# Patient Record
Sex: FEMALE | Race: WHITE | URBAN - METROPOLITAN AREA
[De-identification: names, ages, dates, MRNs, and addresses within clinical notes are randomized per-mention and may not be internally consistent; named-entity substitution may affect disease eponyms.]

---

## 2017-04-28 ENCOUNTER — OFFICE VISIT (OUTPATIENT)
Dept: FAMILY MEDICINE CLINIC | Age: 52
End: 2017-04-28

## 2017-04-28 VITALS
BODY MASS INDEX: 34.99 KG/M2 | RESPIRATION RATE: 12 BRPM | WEIGHT: 210 LBS | OXYGEN SATURATION: 98 % | HEIGHT: 65 IN | DIASTOLIC BLOOD PRESSURE: 74 MMHG | HEART RATE: 88 BPM | SYSTOLIC BLOOD PRESSURE: 126 MMHG

## 2017-04-28 DIAGNOSIS — R22.31 ARM MASS, RIGHT: ICD-10-CM

## 2017-04-28 DIAGNOSIS — L03.113 CELLULITIS OF ARM, RIGHT: Primary | ICD-10-CM

## 2017-04-28 PROCEDURE — 99213 OFFICE O/P EST LOW 20 MIN: CPT | Performed by: FAMILY MEDICINE

## 2017-04-28 RX ORDER — CLINDAMYCIN HYDROCHLORIDE 300 MG/1
300 CAPSULE ORAL 3 TIMES DAILY
Qty: 21 CAPSULE | Refills: 0 | Status: SHIPPED | OUTPATIENT
Start: 2017-04-28 | End: 2017-05-08

## 2018-12-07 ENCOUNTER — OFFICE VISIT (OUTPATIENT)
Dept: FAMILY MEDICINE CLINIC | Age: 53
End: 2018-12-07
Payer: COMMERCIAL

## 2018-12-07 VITALS
SYSTOLIC BLOOD PRESSURE: 122 MMHG | BODY MASS INDEX: 37.65 KG/M2 | HEIGHT: 65 IN | TEMPERATURE: 98.4 F | RESPIRATION RATE: 16 BRPM | OXYGEN SATURATION: 98 % | HEART RATE: 88 BPM | DIASTOLIC BLOOD PRESSURE: 72 MMHG | WEIGHT: 226 LBS

## 2018-12-07 DIAGNOSIS — D49.2 NEOPLASM OF EXTERNAL EAR: ICD-10-CM

## 2018-12-07 DIAGNOSIS — E66.09 CLASS 2 OBESITY DUE TO EXCESS CALORIES WITHOUT SERIOUS COMORBIDITY WITH BODY MASS INDEX (BMI) OF 36.0 TO 36.9 IN ADULT: ICD-10-CM

## 2018-12-07 DIAGNOSIS — Z00.00 WELL ADULT EXAM: Primary | ICD-10-CM

## 2018-12-07 DIAGNOSIS — Z12.11 SCREENING FOR COLON CANCER: ICD-10-CM

## 2018-12-07 DIAGNOSIS — Z23 NEED FOR TDAP VACCINATION: ICD-10-CM

## 2018-12-07 PROCEDURE — 99396 PREV VISIT EST AGE 40-64: CPT | Performed by: FAMILY MEDICINE

## 2018-12-07 PROCEDURE — G8484 FLU IMMUNIZE NO ADMIN: HCPCS | Performed by: FAMILY MEDICINE

## 2018-12-07 PROCEDURE — 90471 IMMUNIZATION ADMIN: CPT | Performed by: FAMILY MEDICINE

## 2018-12-07 PROCEDURE — 90715 TDAP VACCINE 7 YRS/> IM: CPT | Performed by: FAMILY MEDICINE

## 2018-12-07 ASSESSMENT — PATIENT HEALTH QUESTIONNAIRE - PHQ9
SUM OF ALL RESPONSES TO PHQ9 QUESTIONS 1 & 2: 0
2. FEELING DOWN, DEPRESSED OR HOPELESS: 0
SUM OF ALL RESPONSES TO PHQ QUESTIONS 1-9: 0
1. LITTLE INTEREST OR PLEASURE IN DOING THINGS: 0
SUM OF ALL RESPONSES TO PHQ QUESTIONS 1-9: 0

## 2018-12-07 NOTE — PROGRESS NOTES
2018    Ilir Dunlap (:  1965) is a 48 y.o. female, here for a preventive medicine evaluation. Patient Active Problem List   Diagnosis    Stye    Lump or mass in breast    Chest pain    Depression    Nevus, atypical    Obesity    Tobacco use disorder    Stress incontinence     Review of Systems Review of systems in history of present illness or scanned in under media tab. Prior to Visit Medications    Not on File        Allergies   Allergen Reactions    No Known Allergies        Past Medical History:   Diagnosis Date    Asthmatic bronchitis     Breast mass     Depression     Hordeolum externum (stye) 10/01/2008    RECURRENT    Obesity     Stress incontinence     Tobacco use disorder     quit        Past Surgical History:   Procedure Laterality Date    BLADDER REPAIR  1996    lift with cystoscopy and bx. 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  Drug use: No    Sexual activity: No     Other Topics Concern    Not on file     Social History Narrative    Family History: 2010    psychiatric dz    Patient Lives at: home, Support System: adequate    [employer] work only exercise. 18.         Family History   Problem Relation Age of Onset    Heart Failure Mother 61    Obesity Mother     Hypertension Mother     High Cholesterol Mother     Other Mother 64        MRSA infection x 2 yr, history of gastric bypass    Heart Attack Mother         caused by sepis/CHF/CHF    Lung Cancer Father     Other Father 28        DVT/pulmonary embolism after an injury    Cancer Paternal Grandfather         throat versus esophageal- smoker    No Known Problems Brother     Kidney Disease Maternal Grandfather         CKD /renal failure    Cancer Paternal Grandmother         ? ADVANCE DIRECTIVE: N, Not Received    Physical Exam   Constitutional: She appears well-developed and well-nourished. No distress. Obesity. HENT:   Right Ear: Tympanic membrane, external ear and ear canal normal. Tympanic membrane is not retracted and not bulging. No middle ear effusion. Left Ear: Tympanic membrane, external ear and ear canal normal. Tympanic membrane is not retracted and not bulging. No middle ear effusion. Nose: Nose normal. No mucosal edema or rhinorrhea. Mouth/Throat: Uvula is midline, oropharynx is clear and moist and mucous membranes are normal. Mucous membranes are not pale, not dry and not cyanotic. No oral lesions. No uvula swelling or dental caries. No oropharyngeal exudate, posterior oropharyngeal edema, posterior oropharyngeal erythema or tonsillar abscesses. Eyes: Pupils are equal, round, and reactive to light. Conjunctivae and EOM are normal. Right eye exhibits no discharge and no exudate. Left eye exhibits no discharge and no exudate. Right conjunctiva is not injected. Left conjunctiva is not injected. No scleral icterus. Neck: Trachea normal and phonation normal. Neck supple. No tracheal tenderness present. No tracheal deviation present. No thyroid mass and no thyromegaly present. Cardiovascular: Normal rate, regular rhythm and S1 normal.   No extrasystoles are present.  Exam reveals no gallop, no S3, no S4, no distant heart sounds and no friction rub. No murmur heard. Pulses:       Dorsalis pedis pulses are 2+ on the right side, and 2+ on the left side. Posterior tibial pulses are 2+ on the right side, and 2+ on the left side. Pulmonary/Chest: Effort normal and breath sounds normal. No stridor. No respiratory distress. She has no decreased breath sounds. She has no wheezes. She has no rhonchi. She has no rales. Abdominal: Normal appearance. She exhibits no distension, no pulsatile midline mass and no mass. There is no hepatosplenomegaly. There is no tenderness. There is no rigidity, no rebound, no guarding, no CVA tenderness, no tenderness at McBurney's point and negative Guzman's sign. Lymphadenopathy:        Head (right side): No submandibular and no tonsillar adenopathy present. Head (left side): No submandibular and no tonsillar adenopathy present. She has no cervical adenopathy. Right cervical: No superficial cervical, no deep cervical and no posterior cervical adenopathy present. Left cervical: No superficial cervical, no deep cervical and no posterior cervical adenopathy present. She has no axillary adenopathy. Right axillary: No pectoral and no lateral adenopathy present. Left axillary: No pectoral and no lateral adenopathy present. Right: No supraclavicular adenopathy present. Left: No supraclavicular adenopathy present. Neurological: She is alert. She displays no tremor. She exhibits normal muscle tone. Coordination and gait normal.   Reflex Scores:       Patellar reflexes are 2+ on the right side and 2+ on the left side. Skin: Skin is warm and dry. No lesion noted. She is not diaphoretic. No cyanosis. No pallor. Nails show no clubbing. Psychiatric: She has a normal mood and affect. Her behavior is normal. Judgment normal. Cognition and memory are normal.   Nursing note and vitals reviewed.     Vitals:    12/07/18 1443   BP: 122/72   Site: Left Upper Arm Position: Sitting   Cuff Size: Large Adult   Pulse: 88   Resp: 16   Temp: 98.4 °F (36.9 °C)   TempSrc: Oral   SpO2: 98%   Weight: 226 lb (102.5 kg)   Height: 5' 5\" (1.651 m)     BP Readings from Last 3 Encounters:   12/07/18 122/72   04/28/17 126/74     Pulse Readings from Last 3 Encounters:   12/07/18 88   04/28/17 88     Wt Readings from Last 3 Encounters:   12/07/18 226 lb (102.5 kg)   04/28/17 210 lb (95.3 kg)     Body mass index is 37.61 kg/m². No results found for: CHOL, CHOLFAST, TRIG, TRIGLYCFAST, HDL, LDLCHOLESTEROL, LDLCALC, GLUF, GLUCOSE, LABA1C    The ASCVD Risk score (Sathish Martinez, et al., 2013) failed to calculate for the following reasons:    Cannot find a previous HDL lab    Cannot find a previous total cholesterol lab     [facility] MAMMO SCR DIG BILExam Date/Time: 11/29/2018 11:30 AM   SCREENING DIGITAL BILATERAL MAMMOGRAM WITH COMPUTER AIDED DETECTION  HISTORY: Routine annual screening  COMPARISON: 2017 2016, 2015, 2014  TECHNIQUE:   Bilateral mediolateral oblique and craniocaudal views with computer aided   detection  BREAST COMPOSITION:  There are scattered areas of fibroglandular density  FINDINGS:   There is a radiopaque marker seen in the anterior depth of the right upper-outer   breast related to previous biopsy. There are no suspicious calcifications or masses  IMPRESSION:  No mammographic evidence of malignancy  CATEGORY BI-RADS: 2: Benign  MANAGEMENT: Routine annual mammography unless otherwise clinically indicated  NOTE:  The breast center staff will notify the patient of the results.  If there are questions   about the content of this report, please contact [address] [place] [address] radiology by calling [phone]. RISK ASSESSMENT:  A preliminary breast cancer risk assessment was conducted as part of the   patient's screening mammogram visit.  If there are any questions about the risk assessment   process or results, a navigator can be contacted at [phone].   Patient's 5 year [facility] note.    --Yadi Ya, DO on 12/7/2018 at 3:16 PM

## 2019-04-15 ENCOUNTER — OFFICE VISIT (OUTPATIENT)
Dept: FAMILY MEDICINE CLINIC | Age: 54
End: 2019-04-15
Payer: COMMERCIAL

## 2019-04-15 VITALS
DIASTOLIC BLOOD PRESSURE: 68 MMHG | HEART RATE: 84 BPM | WEIGHT: 225 LBS | BODY MASS INDEX: 37.49 KG/M2 | SYSTOLIC BLOOD PRESSURE: 114 MMHG | HEIGHT: 65 IN | OXYGEN SATURATION: 97 % | RESPIRATION RATE: 21 BRPM

## 2019-04-15 DIAGNOSIS — M25.561 ACUTE PAIN OF RIGHT KNEE: Primary | ICD-10-CM

## 2019-04-15 DIAGNOSIS — R03.0 ELEVATED BP WITHOUT DIAGNOSIS OF HYPERTENSION: ICD-10-CM

## 2019-04-15 PROCEDURE — 99215 OFFICE O/P EST HI 40 MIN: CPT | Performed by: FAMILY MEDICINE

## 2019-04-15 PROCEDURE — G8417 CALC BMI ABV UP PARAM F/U: HCPCS | Performed by: FAMILY MEDICINE

## 2019-04-15 PROCEDURE — G8427 DOCREV CUR MEDS BY ELIG CLIN: HCPCS | Performed by: FAMILY MEDICINE

## 2019-04-15 PROCEDURE — 1036F TOBACCO NON-USER: CPT | Performed by: FAMILY MEDICINE

## 2019-04-15 PROCEDURE — 3017F COLORECTAL CA SCREEN DOC REV: CPT | Performed by: FAMILY MEDICINE

## 2019-04-15 RX ORDER — NAPROXEN 500 MG/1
500 TABLET ORAL 2 TIMES DAILY WITH MEALS
Qty: 60 TABLET | Refills: 1 | Status: SHIPPED | OUTPATIENT
Start: 2019-04-15

## 2019-04-15 SDOH — ECONOMIC STABILITY: TRANSPORTATION INSECURITY
IN THE PAST 12 MONTHS, HAS LACK OF TRANSPORTATION KEPT YOU FROM MEETINGS, WORK, OR FROM GETTING THINGS NEEDED FOR DAILY LIVING?: NO

## 2019-04-15 SDOH — ECONOMIC STABILITY: INCOME INSECURITY: HOW HARD IS IT FOR YOU TO PAY FOR THE VERY BASICS LIKE FOOD, HOUSING, MEDICAL CARE, AND HEATING?: NOT HARD AT ALL

## 2019-04-15 SDOH — ECONOMIC STABILITY: FOOD INSECURITY: WITHIN THE PAST 12 MONTHS, YOU WORRIED THAT YOUR FOOD WOULD RUN OUT BEFORE YOU GOT MONEY TO BUY MORE.: NEVER TRUE

## 2019-04-15 SDOH — ECONOMIC STABILITY: TRANSPORTATION INSECURITY
IN THE PAST 12 MONTHS, HAS THE LACK OF TRANSPORTATION KEPT YOU FROM MEDICAL APPOINTMENTS OR FROM GETTING MEDICATIONS?: NO

## 2019-04-15 SDOH — ECONOMIC STABILITY: FOOD INSECURITY: WITHIN THE PAST 12 MONTHS, THE FOOD YOU BOUGHT JUST DIDN'T LAST AND YOU DIDN'T HAVE MONEY TO GET MORE.: NEVER TRUE

## 2019-04-15 ASSESSMENT — PATIENT HEALTH QUESTIONNAIRE - PHQ9
SUM OF ALL RESPONSES TO PHQ QUESTIONS 1-9: 0
1. LITTLE INTEREST OR PLEASURE IN DOING THINGS: 0
2. FEELING DOWN, DEPRESSED OR HOPELESS: 0
SUM OF ALL RESPONSES TO PHQ QUESTIONS 1-9: 0
SUM OF ALL RESPONSES TO PHQ9 QUESTIONS 1 & 2: 0

## 2019-04-15 ASSESSMENT — ENCOUNTER SYMPTOMS
CHEST TIGHTNESS: 0
SHORTNESS OF BREATH: 0

## 2019-04-15 NOTE — PATIENT INSTRUCTIONS
Susan Angelo was seen today for knee pain. Diagnoses and all orders for this visit:    Acute pain of right knee  -     naproxen (NAPROXEN) 500 MG EC tablet; Take 1 tablet by mouth 2 times daily (with meals)  -     diclofenac sodium 1 % GEL; Apply 4 g topically 4 times daily  Omeprazole 20 mg 30 min before breakfast if heartburn. Medrol dose pack if not improving. Ultrasound if not improving. Then to orthopedic if not improving. Elevated BP without diagnosis of hypertension  Watch salt. Gardner's lite or Sea salt (with iodine) both have about 50 % less salt than regular salt. If you use the same amount as you are currently using at the table or in the cooking your blood pressure will go down. Since both these products have high levels of potassium you must be careful in using these with certain blood pressure medicines such as ACE inhibitors (like lisinopril, etc) ARB's (Angiotension receptor blockers - like Valsartan and Losartan, etc) and with potassium sparing diuretics ( Maxzide, Spironolactone, etc). The potassium can go too high (above 5.1). \"No Salt\" (German's) or  Gardner's \"Salt Substitute-sodium free\" will raise your potassium and tastes like salt. They [id number] % potassium and still taste salty but have an even higher risk of increasing the blood potassium level. This can be helpful if your potassium is typically low or low normal on NON-potassium sparing diuretic such as Lasix (furosemide) or HCTZ (hydrocholorothiazide). Mrs MERCEDES is only herbs and can be used with out the concern of too much potassium. It seasons the food but does not taste like salt. Patient Education     Almanza's Cyst: Care Instructions  Your Care Instructions    A Baker's cyst is a swelling behind the knee. It may cause pain or stiffness when you bend your knee or straighten it all the way. Baker's cysts are also called popliteal cysts.   If you have arthritis or another condition that is the cause of the Baker's cyst, your doctor may treat that condition. A Baker's cyst may go away on its own. If not, or if it is causing a lot of discomfort, your doctor may drain the fluid that has built up behind the knee. In some cases, a Baker's cyst is removed in surgery. There are things you can do at home, such as staying off your leg, to reduce the swelling and pain. Follow-up care is a key part of your treatment and safety. Be sure to make and go to all appointments, and call your doctor if you are having problems. It's also a good idea to know your test results and keep a list of the medicines you take. How can you care for yourself at home? · Rest your knee as much as possible. · Ask your doctor if you can take an over-the-counter pain medicine, such as acetaminophen (Tylenol), ibuprofen (Advil, Motrin), or naproxen (Aleve). Be safe with medicines. Read and follow all instructions on the label. · Use a cane, a crutch, a walker, or another device if you need help to get around. These can help rest your knees. · If you have an elastic bandage, make sure it is snug but not so tight that your leg is numb, tingles, or swells below the bandage. Ask your doctor if you can loosen the bandage if it is too tight. · Follow your doctor's instructions about how much weight you can put on your knee. · Stay at a healthy weight. Being overweight puts extra strain on your knee. When should you call for help? Call 911 anytime you think you may need emergency care. For example, call if:    · You have chest pain, are short of breath, or you cough up blood.    Call your doctor now or seek immediate medical care if:    · You have new or worse pain.     · Your foot is cool or pale or changes color.     · You have tingling, weakness, or numbness in your foot or toes.     · You have signs of a blood clot in your leg (called a deep vein thrombosis), such as:  ? Pain in your calf, back of the knee, thigh, or groin. ? Redness or swelling in your leg.  Watch closely for changes in your health, and be sure to contact your doctor if:    · You do not get better as expected. Where can you learn more? Go to https://[employer] Grouppepiceweb.Pap[employer]. org and sign in to your PPS account. Enter J158 in the SpiderSuite box to learn more about \"Baker's Cyst: Care Instructions. \"     If you do not have an account, please click on the \"Sign Up Now\" link. Current as of: September 20, 2018  Content Version: 11.9  © 2797-4492 [employer]. Care instructions adapted under license by [employer] ([facility]). If you have questions about a medical condition or this instruction, always ask your healthcare professional. [address] any warranty or liability for your use of this information. Patient Education        The Knee Joint: Anatomy Sketch    Current as of: September 20, 2018  Content Version: 11.9  © 4876-9064 [employer]. Care instructions adapted under license by [employer] ([facility]). If you have questions about a medical condition or this instruction, always ask your healthcare professional. [address] any warranty or liability for your use of this information. Patient Education        Learning About High Blood Pressure  What is high blood pressure? Blood pressure is a measure of how hard the blood pushes against the walls of your arteries. It's normal for blood pressure to go up and down throughout the day, but if it stays up, you have high blood pressure. Another name for high blood pressure is hypertension. Two numbers tell you your blood pressure. The first number is the systolic pressure. It shows how hard the blood pushes when your heart is pumping. The second number is the diastolic pressure. It shows how hard the blood pushes between heartbeats, when your heart is relaxed and filling with blood. Your doctor will give you a goal for your blood pressure.  Your goal will be based on your health and your age. High blood pressure (hypertension) means that the top number stays high, or the bottom number stays high, or both. High blood pressure increases the risk of stroke, heart attack, and other problems. You and your doctor will talk about your risks of these problems based on your blood pressure. What happens when you have high blood pressure? · Blood flows through your arteries with too much force. Over time, this damages the walls of your arteries. But you can't feel it. High blood pressure usually doesn't cause symptoms. · Fat and calcium start to build up in your arteries. This buildup is called plaque. Plaque makes your arteries narrower and stiffer. Blood can't flow through them as easily. · This lack of good blood flow starts to damage some of the organs in your body. This can lead to problems such as coronary artery disease and heart attack, heart failure, stroke, kidney failure, and eye damage. How can you prevent high blood pressure? · Stay at a healthy weight. · Try to limit how much sodium you eat to less than 2,300 milligrams (mg) a day. If you limit your sodium to 1,500 mg a day, you can lower your blood pressure even more. ? Buy foods that are labeled \"unsalted,\" \"sodium-free,\" or \"low-sodium. \" Foods labeled \"reduced-sodium\" and \"light sodium\" may still have too much sodium. ? Flavor your food with garlic, lemon juice, onion, vinegar, herbs, and spices instead of salt. Do not use soy sauce, steak sauce, onion salt, garlic salt, mustard, or ketchup on your food. ? Use less salt (or none) when recipes call for it. You can often use half the salt a recipe calls for without losing flavor. · Be physically active. Get at least 30 minutes of exercise on most days of the week. Walking is a good choice. You also may want to do other activities, such as running, swimming, cycling, or playing tennis or team sports.   · Limit alcohol to 2 drinks a day for men and 1 drink a day for women.  · Eat plenty of fruits, vegetables, and low-fat dairy products. Eat less saturated and total fats. How is high blood pressure treated? · Your doctor will suggest making lifestyle changes. For example, your doctor may ask you to eat healthy foods, quit smoking, lose extra weight, and be more active. · If lifestyle changes don't help enough, your doctor may recommend that you take medicine. · When blood pressure is very high, medicines are needed to lower it. Follow-up care is a key part of your treatment and safety. Be sure to make and go to all appointments, and call your doctor if you are having problems. It's also a good idea to know your test results and keep a list of the medicines you take. Where can you learn more? Go to https://The Daily Caller.[employer]. org and sign in to your katena account. Enter P501 in the Dealdrive box to learn more about \"Learning About High Blood Pressure. \"     If you do not have an account, please click on the \"Sign Up Now\" link. Current as of: July 22, 2018  Content Version: 11.9  © 9968-6150 [employer]. Care instructions adapted under license by [facility] ([facility]). If you have questions about a medical condition or this instruction, always ask your healthcare professional. [address] any warranty or liability for your use of this information. Patient Education        DASH Diet: Care Instructions  Your Care Instructions    The DASH diet is an eating plan that can help lower your blood pressure. DASH stands for Dietary Approaches to Stop Hypertension. Hypertension is high blood pressure. The DASH diet focuses on eating foods that are high in calcium, potassium, and magnesium. These nutrients can lower blood pressure. The foods that are highest in these nutrients are fruits, vegetables, low-fat dairy products, nuts, seeds, and legumes.  But taking calcium, potassium, and magnesium supplements instead of eating foods that are high in those nutrients does not have the same effect. The DASH diet also includes whole grains, fish, and poultry. The DASH diet is one of several lifestyle changes your doctor may recommend to lower your high blood pressure. Your doctor may also want you to decrease the amount of sodium in your diet. Lowering sodium while following the DASH diet can lower blood pressure even further than just the DASH diet alone. Follow-up care is a key part of your treatment and safety. Be sure to make and go to all appointments, and call your doctor if you are having problems. It's also a good idea to know your test results and keep a list of the medicines you take. How can you care for yourself at home? Following the DASH diet  · Eat 4 to 5 servings of fruit each day. A serving is 1 medium-sized piece of fruit, ½ cup chopped or canned fruit, 1/4 cup dried fruit, or 4 ounces (½ cup) of fruit juice. Choose fruit more often than fruit juice. · Eat 4 to 5 servings of vegetables each day. A serving is 1 cup of lettuce or raw leafy vegetables, ½ cup of chopped or cooked vegetables, or 4 ounces (½ cup) of vegetable juice. Choose vegetables more often than vegetable juice. · Get 2 to 3 servings of low-fat and fat-free dairy each day. A serving is 8 ounces of milk, 1 cup of yogurt, or 1 ½ ounces of cheese. · Eat 6 to 8 servings of grains each day. A serving is 1 slice of bread, 1 ounce of dry cereal, or ½ cup of cooked rice, pasta, or cooked cereal. Try to choose whole-grain products as much as possible. · Limit lean meat, poultry, and fish to 2 servings each day. A serving is 3 ounces, about the size of a deck of cards. · Eat 4 to 5 servings of nuts, seeds, and legumes (cooked dried beans, lentils, and split peas) each week. A serving is 1/3 cup of nuts, 2 tablespoons of seeds, or ½ cup of cooked beans or peas. · Limit fats and oils to 2 to 3 servings each day.  A serving is 1 teaspoon of vegetable oil or 2 tablespoons of salad dressing. · Limit sweets and added sugars to 5 servings or less a week. A serving is 1 tablespoon jelly or jam, ½ cup sorbet, or 1 cup of lemonade. · Eat less than 2,300 milligrams (mg) of sodium a day. If you limit your sodium to 1,500 mg a day, you can lower your blood pressure even more. Tips for success  · Start small. Do not try to make dramatic changes to your diet all at once. You might feel that you are missing out on your favorite foods and then be more likely to not follow the plan. Make small changes, and stick with them. Once those changes become habit, add a few more changes. · Try some of the following:  ? Make it a goal to eat a fruit or vegetable at every meal and at snacks. This will make it easy to get the recommended amount of fruits and vegetables each day. ? Try yogurt topped with fruit and nuts for a snack or healthy dessert. ? Add lettuce, tomato, cucumber, and onion to sandwiches. ? Combine a ready-made pizza crust with low-fat mozzarella cheese and lots of vegetable toppings. Try using tomatoes, squash, spinach, broccoli, carrots, cauliflower, and onions. ? Have a variety of cut-up vegetables with a low-fat dip as an appetizer instead of chips and dip. ? Sprinkle sunflower seeds or chopped almonds over salads. Or try adding chopped walnuts or almonds to cooked vegetables. ? Try some vegetarian meals using beans and peas. Add garbanzo or kidney beans to salads. Make burritos and tacos with mashed bright beans or black beans. Where can you learn more? Go to https://Corrupt LacejanelleEsperotia Energy Investmentseb.healthDove Innovation and Management. org and sign in to your Third Age account. Enter J066 in the [facility] box to learn more about \"DASH Diet: Care Instructions. \"     If you do not have an account, please click on the \"Sign Up Now\" link. Current as of: July 22, 2018  Content Version: 11.9  © 7256-1396 [employer]. Care instructions adapted under license by [facility] ([facility]).  If you have questions about a medical condition or this instruction, always ask your healthcare professional. [place] [address] any warranty or liability for your use of this information.

## 2019-04-15 NOTE — PROGRESS NOTES
Subjective:      Patient ID: Lesley Theodore is a 47 y.o. female. Chief Complaint   Patient presents with    Knee Pain     pt c/o right knee pain and swelling x 2 days. pt jumped off a retaining wall while doing yard work  and knee started to swell. patient states she got the swelling down but then while mowing the grass later on saturday she stepped in a [place] and rolled her knee. right knee is swollen painful, tender to touch, and unable to weight onit. pt using crutch for stability right now   448  HPI  Right knee pain  No prior knee problems. No prior knee injuries/surgeries. No OA in other joints. She tried ice, heat, sports cream, salon-hoara. Took 2 ibuprofen this am and not sure if helped. Hurts worse when she walks flat footed. Less if just toe touches. HTN  Does watch salt. Adds some in cooking. Has used a salt sub also. No current outpatient medications on file. No current facility-administered medications for this visit. Review of Systems   Respiratory: Negative for chest tightness and shortness of breath. Cardiovascular: Negative for chest pain, palpitations and leg swelling. Neurological: Negative for dizziness, tremors, light-headedness and headaches. Objective:   Physical Exam   Constitutional: Vital signs are normal. She appears well-developed. She is cooperative. She does not appear ill. No distress. Musculoskeletal:        Right knee: She exhibits swelling and effusion. She exhibits normal range of motion, no ecchymosis, no deformity, no erythema, normal alignment, normal patellar mobility, no bony tenderness and normal meniscus. Tenderness (in popliteal fossa) found. No medial joint line, no lateral joint line and no patellar tendon tenderness noted. Left knee: She exhibits normal range of motion, no swelling, no effusion, no ecchymosis, no erythema, normal alignment, normal patellar mobility and normal meniscus. No tenderness found.  No medial joint line, no lateral joint line and no patellar tendon tenderness noted. Does have some pain at medial and lateral joint line as well with flexion-extension and internal/external rotation of the foot with the knee flexed. Neurological: She is alert. Skin: She is not diaphoretic. Nursing note and vitals reviewed. Vitals:    04/15/19 1637   BP: 114/68   Site: Left Upper Arm   Position: Sitting   Cuff Size: Large Adult   Pulse: 84   Resp: 21   SpO2: 97%   Weight: 225 lb (102.1 kg)  Comment: shoes on   Height: 5' 5\" (1.651 m)     BP Readings from Last 3 Encounters:   04/15/19 114/68   12/07/18 122/72   04/28/17 126/74     Pulse Readings from Last 3 Encounters:   04/15/19 84   12/07/18 88   04/28/17 88     Wt Readings from Last 3 Encounters:   04/15/19 225 lb (102.1 kg)   12/07/18 226 lb (102.5 kg)   04/28/17 210 lb (95.3 kg)     Body mass index is 37.44 kg/m². Assessment:      1. Acute pain of right knee    2. Elevated BP without diagnosis of hypertension          Plan:       - Counseling More Than 50% of the 40 min Appointment Time     Felisha Lepe was seen today for knee pain. Diagnoses and all orders for this visit:    Acute pain of right knee  -     naproxen (NAPROXEN) 500 MG EC tablet; Take 1 tablet by mouth 2 times daily (with meals)  -     diclofenac sodium 1 % GEL; Apply 4 g topically 4 times daily  Omeprazole 20 mg 30 min before breakfast if heartburn. Medrol dose pack if not improving. Ultrasound if not improving. Due to popliteal fossa pain - rule out popliteal cyst.  No glucosamine chondroitin at this time due to possibility of popliteal cyst.  Then to orthopedic if not improving. May need an MRI of the knee if symptoms of ligamentous or meniscal injury occurs. If knee pain and or swelling is persistent. Discussed minor meniscal tears as usually not requiring surgery unless the knee is locking up or giving out causing prolonged episodes of pain.   Could consider a steroid shot in the knee if patient has good improvement but has residual pain. No steroid shots if surgery is possibility. Elevated BP without diagnosis of hypertension  Watch salt. Gardner's lite or Sea salt (with iodine) both have about 50 % less salt than regular salt. If you use the same amount as you are currently using at the table or in the cooking your blood pressure will go down. Since both these products have high levels of potassium you must be careful in using these with certain blood pressure medicines such as ACE inhibitors (like lisinopril, etc) ARB's (Angiotension receptor blockers - like Valsartan and Losartan, etc) and with potassium sparing diuretics ( Maxzide, Spironolactone, etc). The potassium can go too high (above 5.1). \"No Salt\" (Yi's) or  Gardner's \"Salt Substitute-sodium free\" will raise your potassium and tastes like salt. They jgy765 % potassium and still taste salty but have an even higher risk of increasing the blood potassium level. This can be helpful if your potassium is typically low or low normal on NON-potassium sparing diuretic such as Lasix (furosemide) or HCTZ (hydrocholorothiazide). Mrs MERCEDES is only herbs and can be used with out the concern of too much potassium. It seasons the food but does not taste like salt. Patient Education   Almanza's Cyst: Care Instructions  Your Care Instructions    Patient Education   The Knee Joint: Anatomy Sketch    Patient Education   Learning About High Blood Pressure  What is high blood pressure?      Patient Education   DASH Diet: Care Instructions  Your Care Instructions      Remedios Ramos,

## 2019-07-10 ENCOUNTER — PATIENT MESSAGE (OUTPATIENT)
Dept: FAMILY MEDICINE CLINIC | Age: 54
End: 2019-07-10

## 2019-07-11 ENCOUNTER — PATIENT MESSAGE (OUTPATIENT)
Dept: FAMILY MEDICINE CLINIC | Age: 54
End: 2019-07-11

## 2019-07-11 DIAGNOSIS — M25.561 ACUTE PAIN OF RIGHT KNEE: Primary | ICD-10-CM

## 2019-07-11 DIAGNOSIS — M25.461 EFFUSION OF RIGHT KNEE: ICD-10-CM

## 2019-07-11 NOTE — TELEPHONE ENCOUNTER
From: Vivian Rose  To: Thomas Kaplan DO  Sent: 7/11/2019 1:09 PM EDT  Subject: Non-Urgent Medical Question    I think the ortho surgeon route is my best bet time wise with my work schedule. ...my co-worker has been to [facility] if you can get me the referral info for there that would be awesome. They take our insurance and she raves about the service.  ----- Message -----  From: Thomas Kaplan DO  Sent: 7/11/2019 11:24 AM EDT  To: Natividad Reyes  Subject: RE: Non-Urgent Medical Question  Milton Mota have several options as to what to do for your knee pain. Do you still have swelling and pain behind the knee? This could be a popliteal cyst. We may want to do an ultrasound whether you come in here or go and see the orthopedic surgeon. The first step in treatment with a knee that is swollen is an injection of steroids. You could do that here or at the orthopedic office. You have been on naproxen for a relatively long period of time if you are still taking that. This can cause stomach side effects swelling in the ankles, increased blood pressure and affect the kidneys. It is not a great long-term plan. The diclofenac gel is not associated with as many side effects because it is topical and most of it is not absorbed. However it is not as strong. There is another diclofenac gel that is 3% that has other ingredients:  Biomed formula # 8 E ingredients: Baclofen 2%, Diclofenac 3%, DMSO 5%, Gabapentin 6%, Lidocaine 2% Prilocaine 2%-cream applied 1 to 2 g 2-3 times a day. I know from personal experience that this medication works better than the diclofenac gel 1%. However this is from a compounding pharmacy in [facility] and has to be ordered and then compounded so you can pick it up right away nor can you go into your local pharmacy and get it. They do have a free delivery service. It is often covered by insurance.  In looking at your family history I do not see total knee replacements or hip replacements in family Epidermal Closure Graft Donor Site (Optional): simple interrupted

## 2019-12-06 ENCOUNTER — PATIENT MESSAGE (OUTPATIENT)
Dept: FAMILY MEDICINE CLINIC | Age: 54
End: 2019-12-06

## 2022-01-06 ENCOUNTER — E-VISIT (OUTPATIENT)
Dept: PRIMARY CARE CLINIC | Age: 57
End: 2022-01-06
Payer: COMMERCIAL

## 2022-01-06 ENCOUNTER — TELEPHONE (OUTPATIENT)
Dept: FAMILY MEDICINE CLINIC | Age: 57
End: 2022-01-06

## 2022-01-06 DIAGNOSIS — U07.1 COVID-19: Primary | ICD-10-CM

## 2022-01-06 PROCEDURE — 99422 OL DIG E/M SVC 11-20 MIN: CPT | Performed by: NURSE PRACTITIONER

## 2022-01-06 NOTE — TELEPHONE ENCOUNTER
----- Message from Tiesha Thompson sent at 2022  1:30 PM EST -----  Subject: Message to Provider    QUESTIONS  Information for Provider? Patient was tested positive on Dec 3rd for Covid   and was wondering if she was able to get a prescription for a ZPack and a   inhaler because hers is currently . ---------------------------------------------------------------------------  --------------  Nilson GO  What is the best way for the office to contact you? OK to leave message on   voicemail  Preferred Call Back Phone Number? [phone]  ---------------------------------------------------------------------------  --------------  SCRIPT ANSWERS  Relationship to Patient?  Self

## 2022-01-07 ASSESSMENT — LIFESTYLE VARIABLES
SMOKING_STATUS: YES
SMOKING_YEARS: 40

## 2022-01-07 NOTE — PATIENT INSTRUCTIONS
Patient Education        10 Things to Do When You Have COVID-19      Stay home. Don't go to school, work, or public areas. And don't use public transportation, ride-shares, or taxis unless you have no choice. Leave your home only if you need to get medical care. But call the doctor's office first so they know you're coming. And wear a mask. Ask before leaving isolation. Follow your doctor's advice about when it is safe for you to leave isolation. Wear a mask when you are around other people. It can help stop the spread of the virus. Limit contact with people in your home. If possible, stay in a separate bedroom and use a separate bathroom. Avoid contact with pets and other animals. If possible, have a friend or family member care for them while you're sick. Cover your mouth and nose with a tissue when you cough or sneeze. Then throw the tissue in the trash right away. Wash your hands often, especially after you cough or sneeze. Use soap and water, and scrub for at least 20 seconds. If soap and water aren't available, use an alcohol-based hand . Don't share personal household items. These include bedding, towels, cups and glasses, and eating utensils. Clean and disinfect your home every day. Use household  or disinfectant wipes or sprays. If needed, take acetaminophen (Tylenol) or ibuprofen (Advil, Motrin) to relieve fever and body aches. Read and follow all instructions on the label. Current as of: March 26, 2021               Content Version: 13.1  © 2006-2021 Healthwise, [employer]. Care instructions adapted under license by [facility] ([facility]). If you have questions about a medical condition or this instruction, always ask your healthcare professional. [place] [address] any warranty or liability for your use of this information.          Patient Education        Coronavirus (NCPWB-11): Care Instructions  Overview  The coronavirus disease (COVID-19) is caused by a virus. Symptoms may include a fever, a cough, and shortness of breath. It can spread through droplets from coughing and sneezing, breathing, and singing. The virus also can spread when people are in close contact with someone who is infected. Most people have mild symptoms and can take care of themselves at home. If their symptoms get worse, they may need care in a hospital. Treatment may include medicines to reduce symptoms, plus breathing support such as oxygen therapy or a ventilator. It's important to not spread the virus to others. If you have COVID-19, wear a mask anytime you are around other people. It can help stop the spread of the virus. You need to isolate yourself while you are sick. Leave your home only if you need to get medical care or testing. Follow-up care is a key part of your treatment and safety. Be sure to make and go to all appointments, and call your doctor if you are having problems. It's also a good idea to know your test results and keep a list of the medicines you take. How can you care for yourself at home? · Get extra rest. It can help you feel better. · Drink plenty of fluids. This helps replace fluids lost from fever. Fluids may also help ease a scratchy throat. · You can take acetaminophen (Tylenol) or ibuprofen (Advil, Motrin) to reduce a fever. It may also help with muscle and body aches. Read and follow all instructions on the label. · Use petroleum jelly on sore skin. This can help if the skin around your nose and lips becomes sore from rubbing a lot with tissues. If you use oxygen, use a water-based product instead of petroleum jelly. · Keep track of symptoms such as fever and shortness of breath. This can help you know if you need to call your doctor. It can also help you know when it's safe to be around other people. · In some cases, your doctor might suggest that you get a pulse oximeter.   How can you self-isolate when you have COVID-19? If you have COVID-19, there are things you can do to help avoid spreading the virus to others. · Limit contact with people in your home. If possible, stay in a separate bedroom and use a separate bathroom. · Wear a mask when you are around other people. · If you have to leave home, avoid crowds and try to stay at least 6 feet away from other people. · Avoid contact with pets and other animals. · Cover your mouth and nose with a tissue when you cough or sneeze. Then throw it in the trash right away. · Wash your hands often, especially after you cough or sneeze. Use soap and water, and scrub for at least 20 seconds. If soap and water aren't available, use an alcohol-based hand . · Don't share personal household items. These include bedding, towels, cups and glasses, and eating utensils. · [address] in the warmest water allowed for the fabric type, and dry it completely. It's okay to wash other people's laundry with yours. · Clean and disinfect your home. Use household  and disinfectant wipes or sprays. When can you end self-isolation for COVID-19? If you know or think that you have the virus, you will need to self-isolate. You can be around others after:  · It's been at least 10 days since your symptoms started and  · You haven't had a fever for 24 hours without taking medicines to lower the fever and  · Your symptoms are improving. If you tested positive but have no symptoms, you can end isolation after 10 days. But if you start to have symptoms, follow the steps above. Ask your doctor if you need to be tested before you end isolation. This is especially important if you have a weakened immune system. When should you call for help? Call 911 anytime you think you may need emergency care. For example, call if you have life-threatening symptoms, such as:    · You have severe trouble breathing.  (You can't talk at all.)     · You have constant chest pain or pressure.     · You are severely dizzy or lightheaded.     · You are confused or can't think clearly.     · You have pale, gray, or blue-colored skin or lips.     · You pass out (lose consciousness) or are very hard to wake up. Call your doctor now or seek immediate medical care if:    · You have moderate trouble breathing. (You can't speak a full sentence.)     · You are coughing up blood (more than about 1 teaspoon).     · You have signs of low blood pressure. These include feeling lightheaded; being too weak to stand; and having cold, pale, clammy skin. Watch closely for changes in your health, and be sure to contact your doctor if:    · Your symptoms get worse.     · You are not getting better as expected.     · You have new or worse symptoms of anxiety, depression, nightmares, or flashbacks. Call before you go to the doctor's office. Follow their instructions. And wear a mask. Current as of: July 1, 2021               Content Version: 13.1  © 2006-2021 Healthwise, [employer]. Care instructions adapted under license by [facility] ([facility]). If you have questions about a medical condition or this instruction, always ask your healthcare professional. [place] [address] any warranty or liability for your use of this information.

## 2022-01-07 NOTE — PROGRESS NOTES
Reviewed questionnaire  Reviewed previous encounters, medications, allergies and history     Dx Covid 23     Plan  I recommend symptom control as Covid is a virus. Please follow guidelines below. Follow CDC guidelines for quarantine. Follow the primary care provider if symptoms do not improve.     1. Water: Drink 1/2 of body weight (lb) in ounces,  Up to 90 Ounces of water per day. This will loosen mucus in the head and chest & improve the weak feeling of dehydration, allow the body to get germ fighting resources to the infection. Half can be juice or sugar free powerade or garorate. Don't count drinks with caffeine or carbonation. Infants can have Pedialyte liquid or freezer pops. Avoid salt if you have high Blood Pressure, swelling in the feet or ankles or have heart problems. 2. Humidity: Humidify the air to 35-50% ( or until the windows fog over slightly).  Summer use of an air conditioner turned down too far and can result in dry air. Can use a humidifier, vaporizer, boil water on the stove or put a coffee can full of water on the heater vents. This will loosen mucus from infections and allergies. 3. Sleep: Get 8-10 hours a night and rest during the evening after work or school. If you have trouble sleeping, adults can take Melatonin 5mg up to 2 tabs at bedtime ( not for children or pregnant women). If Mono is suspected then sleep during 9PM to 9AM time span (if possible.)  4. Cough: Take cough medicines with Guaifenesin ( to loosen chest or head congestion) and Dextromethorphan ( to decrease excess cough). Robitussin D.M. Syrup every 4-6 hrs or Mucinex D. M. pills twice a day. Use the pediatric formulations for children over 6 months making sure they are alcohol & sugar free for children, pregnant women, and diabetics. 5. Pain And Fevers: Take Acetaminophen ( Tylenol) for fevers, aches, and headaches. 2-500 mg every 8 hours for adults. Appropriate doses at bedtime for children may help them sleep better. Ibuprofen may be used if not pregnant, but should be given with food to avoid nausea. Avoid Ibuprofen if you have high blood pressure, CHF, or kidney problems. 6.Gargle: (DAY ONE OF SYMPTOMS) Gargle in the back of the throat with the head tilted back and to the sides with a strong mouthwash  ( Listerine or Scope) after meals and at bedtime at least 4 -5 times a day. This helps kill bacteria and viruses in the back of the throat and will shorten the duration and decrease the severity of your symptoms: sore throat, cough, ear popping,/ear pain, and possibly dizziness. 7. Smoking: Avoid smoking or exposure to second hand smoke. 8. Zinc: (DAY ONE OF SYMPTOMS)  Zinc lozenges such as Cold Moncho (available most stores), or Basic ([employer] brand) will help shorten the duration and lessen symptoms such as sore throat, cough, nasal congestion, runny nose, and post nasal drip. Use 1 lozenge every 2-4 hours ( after meals if stomach is sensitive). Children can use 10-15 mg or less 3-4 times a day or Zinc lollypops. In pregnancy limit to 50-60 mg a day for 7 days as prenatals have Zinc also.   With diarrhea use zinc pills 50 mg 1/2 to 1 pill 2x/day. 9. Vitamins: Vitamin C 500 mg with breakfast and dinner. Children and pregnant women should drink citrus juices. This speeds healing and strengthens immune system. 10. Chest Symptoms: Vicks Vapor rub to the chest at bedtime. 11. Decongestants: Avoid all decongestants if you have high blood pressure. Safe to take if you do not have high blood pressure. Try all of the above starting with day 1 of symptoms. If Strep throat symptoms appear call to be seen in the office as soon as possible and don't gargle on that day. Newborns, infants, or anyone with earaches or influenza may need to be seen quickly. Adults with fevers over 103 degrees or shortness of breath should call the office immediately.   12. Nasal saline spray or rinse.  There are many different ways to do this OTC.     I hope that you feel better.  If you do not feel better after treatment, please f/u with pcp.     Time spent 11-20 minutes

## 2024-11-24 SDOH — HEALTH STABILITY: PHYSICAL HEALTH: ON AVERAGE, HOW MANY DAYS PER WEEK DO YOU ENGAGE IN MODERATE TO STRENUOUS EXERCISE (LIKE A BRISK WALK)?: 3 DAYS

## 2024-11-26 ENCOUNTER — OFFICE VISIT (OUTPATIENT)
Dept: FAMILY MEDICINE CLINIC | Age: 59
End: 2024-11-26
Payer: COMMERCIAL

## 2024-11-26 VITALS
HEART RATE: 89 BPM | HEIGHT: 65 IN | BODY MASS INDEX: 37.92 KG/M2 | RESPIRATION RATE: 17 BRPM | DIASTOLIC BLOOD PRESSURE: 80 MMHG | OXYGEN SATURATION: 95 % | WEIGHT: 227.6 LBS | SYSTOLIC BLOOD PRESSURE: 122 MMHG

## 2024-11-26 DIAGNOSIS — K59.00 CONSTIPATION, UNSPECIFIED CONSTIPATION TYPE: ICD-10-CM

## 2024-11-26 DIAGNOSIS — G56.21 ENTRAPMENT OF RIGHT ULNAR NERVE: Primary | ICD-10-CM

## 2024-11-26 DIAGNOSIS — Z11.59 ENCOUNTER FOR HEPATITIS C SCREENING TEST FOR LOW RISK PATIENT: ICD-10-CM

## 2024-11-26 DIAGNOSIS — Z13.6 ENCOUNTER FOR SCREENING FOR CARDIOVASCULAR DISORDERS: ICD-10-CM

## 2024-11-26 DIAGNOSIS — R01.1 HEART MURMUR: ICD-10-CM

## 2024-11-26 DIAGNOSIS — Z12.11 ENCOUNTER FOR SCREENING FOR MALIGNANT NEOPLASM OF COLON: ICD-10-CM

## 2024-11-26 PROCEDURE — G8484 FLU IMMUNIZE NO ADMIN: HCPCS | Performed by: INTERNAL MEDICINE

## 2024-11-26 PROCEDURE — G8427 DOCREV CUR MEDS BY ELIG CLIN: HCPCS | Performed by: INTERNAL MEDICINE

## 2024-11-26 PROCEDURE — 3017F COLORECTAL CA SCREEN DOC REV: CPT | Performed by: INTERNAL MEDICINE

## 2024-11-26 PROCEDURE — G8417 CALC BMI ABV UP PARAM F/U: HCPCS | Performed by: INTERNAL MEDICINE

## 2024-11-26 PROCEDURE — 99204 OFFICE O/P NEW MOD 45 MIN: CPT | Performed by: INTERNAL MEDICINE

## 2024-11-26 PROCEDURE — 4004F PT TOBACCO SCREEN RCVD TLK: CPT | Performed by: INTERNAL MEDICINE

## 2024-11-26 SDOH — ECONOMIC STABILITY: FOOD INSECURITY: WITHIN THE PAST 12 MONTHS, THE FOOD YOU BOUGHT JUST DIDN'T LAST AND YOU DIDN'T HAVE MONEY TO GET MORE.: NEVER TRUE

## 2024-11-26 SDOH — ECONOMIC STABILITY: INCOME INSECURITY: HOW HARD IS IT FOR YOU TO PAY FOR THE VERY BASICS LIKE FOOD, HOUSING, MEDICAL CARE, AND HEATING?: NOT VERY HARD

## 2024-11-26 SDOH — ECONOMIC STABILITY: FOOD INSECURITY: WITHIN THE PAST 12 MONTHS, YOU WORRIED THAT YOUR FOOD WOULD RUN OUT BEFORE YOU GOT MONEY TO BUY MORE.: NEVER TRUE

## 2024-11-26 ASSESSMENT — PATIENT HEALTH QUESTIONNAIRE - PHQ9
8. MOVING OR SPEAKING SO SLOWLY THAT OTHER PEOPLE COULD HAVE NOTICED. OR THE OPPOSITE, BEING SO FIGETY OR RESTLESS THAT YOU HAVE BEEN MOVING AROUND A LOT MORE THAN USUAL: NOT AT ALL
SUM OF ALL RESPONSES TO PHQ QUESTIONS 1-9: 0
3. TROUBLE FALLING OR STAYING ASLEEP: NOT AT ALL
SUM OF ALL RESPONSES TO PHQ9 QUESTIONS 1 & 2: 0
4. FEELING TIRED OR HAVING LITTLE ENERGY: NOT AT ALL
6. FEELING BAD ABOUT YOURSELF - OR THAT YOU ARE A FAILURE OR HAVE LET YOURSELF OR YOUR FAMILY DOWN: NOT AT ALL
1. LITTLE INTEREST OR PLEASURE IN DOING THINGS: NOT AT ALL
SUM OF ALL RESPONSES TO PHQ QUESTIONS 1-9: 0
SUM OF ALL RESPONSES TO PHQ QUESTIONS 1-9: 0
7. TROUBLE CONCENTRATING ON THINGS, SUCH AS READING THE NEWSPAPER OR WATCHING TELEVISION: NOT AT ALL
SUM OF ALL RESPONSES TO PHQ QUESTIONS 1-9: 0
2. FEELING DOWN, DEPRESSED OR HOPELESS: NOT AT ALL
5. POOR APPETITE OR OVEREATING: NOT AT ALL
10. IF YOU CHECKED OFF ANY PROBLEMS, HOW DIFFICULT HAVE THESE PROBLEMS MADE IT FOR YOU TO DO YOUR WORK, TAKE CARE OF THINGS AT HOME, OR GET ALONG WITH OTHER PEOPLE: NOT DIFFICULT AT ALL
9. THOUGHTS THAT YOU WOULD BE BETTER OFF DEAD, OR OF HURTING YOURSELF: NOT AT ALL

## 2024-11-26 NOTE — PROGRESS NOTES
visit was completed using a template.  I have seen and examined the patient.  Everything documented was personally performed at this visit with the necessary additions, deletions and changes made as appropriate. Voice recognition software may have been used to facilitate documentation and note may have errors due to use of this software. Please reach out if any questions about contents of this note.        Mariusz Bragg MD  Med-Peds: Primary Care  [address] [address]  Office Phone: [phone]  Office Fax: [phone]

## 2024-11-26 NOTE — PATIENT INSTRUCTIONS
I sent referral for hand ortho if symptoms dont resolve with hand stretches noted above.     I have also placed orders for fasting labs, the  can schedule these for your.     I placed order for echocardiogram to check on your heart murmur.       For your constipation,    Start with making sure you are staying hydrated and eating fiber, additional information below on this.  Remember that fruits with the letter P in them will help you poop.     From there, take 1-2 capfuls of miralax mixed into 8-16oz of fluids (water, juice, sport drinks) twice per day (morning and early afternoon; avoid taking in late evening or night)     -If no improvement in 1-2days, continue to take miralax and add senna taking two 8.6mg tablets twice daily.     If no improvement with using miralax with senna, next steps would be to add biscodyl (dolcolax) 10-15mg oral tablet daily or consider using biscodyl suppository or  fleet enema.     Eating and drinking      Eat foods that have a lot of fiber, such as:    o Fresh fruits and vegetables.   o Whole grains.   o Beans.   Eat less of foods that are low in fiber and high in fat and sugar, such as:   o French fries.   o Hamburgers.   o Cookies.   o Candy.   o Soda.   Drink enough fluid to keep your pee (urine) pale yellow.      Other helpful tips  -Exercise regularly or as told by your doctor. Try to do 150 minutes of exercise each week.    - Go to the restroom when you feel like you need to poop. Do not hold it in.    -When you poop:   o Do deep breathing while relaxing your lower belly (abdomen).   o Relax your pelvic floor. The pelvic floor is a group of muscles that support the rectum, bladder, and intestines (as well as the uterus in women).    - Watch your condition for any changes. Tell your doctor if you notice any. Keep all follow-up visits as told by your doctor.     Contact a doctor if:   You have pain that gets worse.   You have a fever.   You have not pooped for 4 days.

## 2024-12-03 ENCOUNTER — HOSPITAL ENCOUNTER (OUTPATIENT)
Dept: CARDIOLOGY | Age: 59
Discharge: HOME | End: 2024-12-05
Attending: INTERNAL MEDICINE
Payer: COMMERCIAL

## 2024-12-03 VITALS
HEIGHT: 65 IN | WEIGHT: 227 LBS | SYSTOLIC BLOOD PRESSURE: 165 MMHG | DIASTOLIC BLOOD PRESSURE: 90 MMHG | BODY MASS INDEX: 37.82 KG/M2

## 2024-12-03 DIAGNOSIS — R01.1 HEART MURMUR: ICD-10-CM

## 2024-12-03 DIAGNOSIS — G56.21 ENTRAPMENT OF RIGHT ULNAR NERVE: ICD-10-CM

## 2024-12-03 DIAGNOSIS — K59.00 CONSTIPATION, UNSPECIFIED CONSTIPATION TYPE: ICD-10-CM

## 2024-12-03 DIAGNOSIS — Z11.59 ENCOUNTER FOR HEPATITIS C SCREENING TEST FOR LOW RISK PATIENT: ICD-10-CM

## 2024-12-03 DIAGNOSIS — Z13.6 ENCOUNTER FOR SCREENING FOR CARDIOVASCULAR DISORDERS: ICD-10-CM

## 2024-12-03 LAB
ECHO AO ASC DIAM: 3.6 CM
ECHO AO ASCENDING AORTA INDEX: 1.72 CM/M2
ECHO AO ROOT DIAM: 3.4 CM
ECHO AO ROOT INDEX: 1.63 CM/M2
ECHO AV AREA PEAK VELOCITY: 2.3 CM2
ECHO AV AREA VTI: 2 CM2
ECHO AV AREA/BSA PEAK VELOCITY: 1.1 CM2/M2
ECHO AV AREA/BSA VTI: 1 CM2/M2
ECHO AV MEAN GRADIENT: 5 MMHG
ECHO AV MEAN VELOCITY: 1.1 M/S
ECHO AV PEAK GRADIENT: 10 MMHG
ECHO AV PEAK VELOCITY: 1.6 M/S
ECHO AV VELOCITY RATIO: 0.69
ECHO AV VTI: 38.2 CM
ECHO BSA: 2.17 M2
ECHO LA AREA 2C: 17.1 CM2
ECHO LA AREA 4C: 13.6 CM2
ECHO LA DIAMETER INDEX: 1.44 CM/M2
ECHO LA DIAMETER: 3 CM
ECHO LA MAJOR AXIS: 5.5 CM
ECHO LA MINOR AXIS: 5.4 CM
ECHO LA TO AORTIC ROOT RATIO: 0.88
ECHO LA VOL BP: 35 ML (ref 22–52)
ECHO LA VOL MOD A2C: 44 ML (ref 22–52)
ECHO LA VOL MOD A4C: 28 ML (ref 22–52)
ECHO LA VOL/BSA BIPLANE: 17 ML/M2 (ref 16–34)
ECHO LA VOLUME INDEX MOD A2C: 21 ML/M2 (ref 16–34)
ECHO LA VOLUME INDEX MOD A4C: 13 ML/M2 (ref 16–34)
ECHO LV E' LATERAL VELOCITY: 6.89 CM/S
ECHO LV E' SEPTAL VELOCITY: 4.78 CM/S
ECHO LV EDV A2C: 112 ML
ECHO LV EDV A4C: 116 ML
ECHO LV EDV INDEX A4C: 56 ML/M2
ECHO LV EDV NDEX A2C: 54 ML/M2
ECHO LV EF PHYSICIAN: 53 %
ECHO LV EJECTION FRACTION A2C: 45 %
ECHO LV EJECTION FRACTION A4C: 47 %
ECHO LV ESV A2C: 62 ML
ECHO LV ESV A4C: 62 ML
ECHO LV ESV INDEX A2C: 30 ML/M2
ECHO LV ESV INDEX A4C: 30 ML/M2
ECHO LV FRACTIONAL SHORTENING: 33 % (ref 28–44)
ECHO LV GLOBAL LONGITUDINAL STRAIN (GLS): -17.4 %
ECHO LV INTERNAL DIMENSION DIASTOLE INDEX: 2.01 CM/M2
ECHO LV INTERNAL DIMENSION DIASTOLIC: 4.2 CM (ref 3.9–5.3)
ECHO LV INTERNAL DIMENSION SYSTOLIC INDEX: 1.34 CM/M2
ECHO LV INTERNAL DIMENSION SYSTOLIC: 2.8 CM
ECHO LV IVSD: 1.2 CM (ref 0.6–0.9)
ECHO LV MASS 2D: 157.1 G (ref 67–162)
ECHO LV MASS INDEX 2D: 75.1 G/M2 (ref 43–95)
ECHO LV POSTERIOR WALL DIASTOLIC: 1 CM (ref 0.6–0.9)
ECHO LV RELATIVE WALL THICKNESS RATIO: 0.48
ECHO LVOT AREA: 3.1 CM2
ECHO LVOT AV VTI INDEX: 0.64
ECHO LVOT DIAM: 2 CM
ECHO LVOT MEAN GRADIENT: 3 MMHG
ECHO LVOT PEAK GRADIENT: 5 MMHG
ECHO LVOT PEAK VELOCITY: 1.1 M/S
ECHO LVOT STROKE VOLUME INDEX: 36.8 ML/M2
ECHO LVOT SV: 76.9 ML
ECHO LVOT VTI: 24.5 CM
ECHO MV A VELOCITY: 1.01 M/S
ECHO MV AREA VTI: 2.1 CM2
ECHO MV E VELOCITY: 0.9 M/S
ECHO MV E/A RATIO: 0.89
ECHO MV E/E' LATERAL: 13.06
ECHO MV E/E' RATIO (AVERAGED): 15.95
ECHO MV E/E' SEPTAL: 18.83
ECHO MV LVOT VTI INDEX: 1.53
ECHO MV MAX VELOCITY: 1 M/S
ECHO MV MEAN GRADIENT: 2 MMHG
ECHO MV MEAN VELOCITY: 0.7 M/S
ECHO MV PEAK GRADIENT: 4 MMHG
ECHO MV VTI: 37.4 CM
ECHO RA AREA 4C: 12.1 CM2
ECHO RA END SYSTOLIC VOLUME APICAL 4 CHAMBER INDEX BSA: 11 ML/M2
ECHO RA VOLUME: 24 ML
ECHO RV BASAL DIMENSION: 2.8 CM
ECHO RV FREE WALL PEAK S': 13.4 CM/S
ECHO RV LONGITUDINAL DIMENSION: 7 CM
ECHO RV MID DIMENSION: 2.1 CM
ECHO RV TAPSE: 2.8 CM (ref 1.7–?)
ECHO TV REGURGITANT MAX VELOCITY: 1.71 M/S
ECHO TV REGURGITANT PEAK GRADIENT: 12 MMHG
TSH SERPL DL<=0.005 MIU/L-ACNC: 2.14 UIU/ML (ref 0.27–4.2)

## 2024-12-03 PROCEDURE — 93356 MYOCRD STRAIN IMG SPCKL TRCK: CPT | Performed by: INTERNAL MEDICINE

## 2024-12-03 PROCEDURE — 93356 MYOCRD STRAIN IMG SPCKL TRCK: CPT

## 2024-12-03 PROCEDURE — 93306 TTE W/DOPPLER COMPLETE: CPT | Performed by: INTERNAL MEDICINE

## 2024-12-03 PROCEDURE — 93306 TTE W/DOPPLER COMPLETE: CPT

## 2024-12-09 ENCOUNTER — OFFICE VISIT (OUTPATIENT)
Dept: FAMILY MEDICINE CLINIC | Age: 59
End: 2024-12-09
Payer: COMMERCIAL

## 2024-12-09 VITALS
DIASTOLIC BLOOD PRESSURE: 82 MMHG | HEART RATE: 103 BPM | RESPIRATION RATE: 17 BRPM | BODY MASS INDEX: 37.19 KG/M2 | TEMPERATURE: 98 F | HEIGHT: 65 IN | OXYGEN SATURATION: 96 % | WEIGHT: 223.2 LBS | SYSTOLIC BLOOD PRESSURE: 136 MMHG

## 2024-12-09 DIAGNOSIS — K57.92 DIVERTICULITIS: Primary | ICD-10-CM

## 2024-12-09 PROCEDURE — 99213 OFFICE O/P EST LOW 20 MIN: CPT | Performed by: INTERNAL MEDICINE

## 2024-12-09 PROCEDURE — G8417 CALC BMI ABV UP PARAM F/U: HCPCS | Performed by: INTERNAL MEDICINE

## 2024-12-09 PROCEDURE — 4004F PT TOBACCO SCREEN RCVD TLK: CPT | Performed by: INTERNAL MEDICINE

## 2024-12-09 PROCEDURE — G8484 FLU IMMUNIZE NO ADMIN: HCPCS | Performed by: INTERNAL MEDICINE

## 2024-12-09 PROCEDURE — G8427 DOCREV CUR MEDS BY ELIG CLIN: HCPCS | Performed by: INTERNAL MEDICINE

## 2024-12-09 PROCEDURE — 3017F COLORECTAL CA SCREEN DOC REV: CPT | Performed by: INTERNAL MEDICINE

## 2024-12-09 RX ORDER — METRONIDAZOLE 500 MG/1
500 TABLET ORAL 3 TIMES DAILY
Qty: 30 TABLET | Refills: 0 | Status: SHIPPED | OUTPATIENT
Start: 2024-12-09 | End: 2024-12-19

## 2024-12-09 RX ORDER — ONDANSETRON 4 MG/1
4 TABLET, FILM COATED ORAL 3 TIMES DAILY PRN
Qty: 30 TABLET | Refills: 0 | Status: SHIPPED | OUTPATIENT
Start: 2024-12-09

## 2024-12-09 RX ORDER — CIPROFLOXACIN 500 MG/1
500 TABLET, FILM COATED ORAL 2 TIMES DAILY
Qty: 20 TABLET | Refills: 0 | Status: SHIPPED | OUTPATIENT
Start: 2024-12-09 | End: 2024-12-19

## 2024-12-09 RX ORDER — DICYCLOMINE HYDROCHLORIDE 10 MG/1
10 CAPSULE ORAL 4 TIMES DAILY PRN
Qty: 60 CAPSULE | Refills: 0 | Status: SHIPPED | OUTPATIENT
Start: 2024-12-09

## 2024-12-09 NOTE — PATIENT INSTRUCTIONS
Take ciprofloxacin twice daily and metronidazole three times daily for 10 days.     Take bentyl as needed up to 4 times a day for abdominal pain    Take zofran 15 minutes before meal to help with eating.

## 2024-12-09 NOTE — PROGRESS NOTES
[facility] - Primary Care   PCP progress note     Patient: Kristi Reyes : 1965  Sex: female  MRN: [id number]    Assessment and Plan:     Assessment & Plan  Diverticulitis  Most likely diverticulitis, not able to confirm previous finding of diverticula, but location and symptoms consistent with this diagnosis.  Treat with 10 days of ciprofloxacin twice daily, metronidazole 3 times daily  As needed Bentyl for spasm and pain  As needed Zofran for nausea    Advised to let me know tonight or tomorrow morning if pain is not improved with Bentyl.    Advised to remain out of work for at least the next 2 days.    If not improving by in the week, we will move forward with a CT scan              -Risks and benefit as well as most common side effects of new medications were discussed with patient.  -Recommended immunizations and screenings as noted in patients care gaps report. Patient was agreeable to screening tests for appropriate indications as listed below       Patient's questions answered. Additional information printed on AVS.  Patient/Caregiver verbalizes understanding of plan of care/instructions discussed today.       LOS Today       Follow-up Information  No follow-ups on file.      Subjective:  HPI:   Chief Complaint   Patient presents with    Abdominal Pain     Friday evening     Diarrhea     Friday evening         Friday was at home and got sandwitches from boss at lunch, that night started with symptoms. Feeling very bad spasms that is very painful at her leftlower quadrant. Endorses diarrhea.  Havne't eaten anything.   Tried to eat, but pian is too much and not able to eat.   Having diarrhea; not having diarrhea in the last day.   Able to drink. No vomiting, but nausea present. No anjana blood in stool  Reports fevers.          Review of Systems ROS negative unless otherwise specified in HPI.     The following portions of the patient's chart were reviewed in this encounter and updated as

## 2024-12-10 LAB — NONINV COLON CA DNA+OCC BLD SCRN STL QL: NEGATIVE

## 2024-12-30 ENCOUNTER — TELEPHONE (OUTPATIENT)
Dept: FAMILY MEDICINE CLINIC | Age: 59
End: 2024-12-30

## 2024-12-30 NOTE — TELEPHONE ENCOUNTER
Pt dropped off [facility] paperwork for dr castellanos to sign. Paper placed in inbasket

## 2025-01-10 ENCOUNTER — TELEPHONE (OUTPATIENT)
Dept: FAMILY MEDICINE CLINIC | Age: 60
End: 2025-01-10

## 2025-01-10 NOTE — TELEPHONE ENCOUNTER
Please look at the forms . There are a few questions that needs addressed . Part a for the 1 week and then second page questions 3 and 7 need completed .

## 2025-02-03 ENCOUNTER — TELEMEDICINE (OUTPATIENT)
Dept: FAMILY MEDICINE CLINIC | Age: 60
End: 2025-02-03
Payer: COMMERCIAL

## 2025-02-03 DIAGNOSIS — J06.9 VIRAL UPPER RESPIRATORY TRACT INFECTION: Primary | ICD-10-CM

## 2025-02-03 PROCEDURE — 99212 OFFICE O/P EST SF 10 MIN: CPT | Performed by: INTERNAL MEDICINE

## 2025-02-03 PROCEDURE — G8417 CALC BMI ABV UP PARAM F/U: HCPCS | Performed by: INTERNAL MEDICINE

## 2025-02-03 PROCEDURE — 4004F PT TOBACCO SCREEN RCVD TLK: CPT | Performed by: INTERNAL MEDICINE

## 2025-02-03 PROCEDURE — G8427 DOCREV CUR MEDS BY ELIG CLIN: HCPCS | Performed by: INTERNAL MEDICINE

## 2025-02-03 PROCEDURE — 3017F COLORECTAL CA SCREEN DOC REV: CPT | Performed by: INTERNAL MEDICINE

## 2025-02-03 RX ORDER — BENZONATATE 100 MG/1
100 CAPSULE ORAL 3 TIMES DAILY PRN
Qty: 30 CAPSULE | Refills: 0 | Status: SHIPPED | OUTPATIENT
Start: 2025-02-03 | End: 2025-02-13

## 2025-02-03 RX ORDER — PREDNISONE 10 MG/1
TABLET ORAL
Qty: 40 TABLET | Refills: 0 | Status: SHIPPED | OUTPATIENT
Start: 2025-02-03 | End: 2025-02-03 | Stop reason: CLARIF

## 2025-02-03 RX ORDER — PREDNISONE 20 MG/1
40 TABLET ORAL DAILY
Qty: 10 TABLET | Refills: 0 | Status: SHIPPED | OUTPATIENT
Start: 2025-02-03 | End: 2025-02-08

## 2025-02-03 SDOH — ECONOMIC STABILITY: FOOD INSECURITY: WITHIN THE PAST 12 MONTHS, THE FOOD YOU BOUGHT JUST DIDN'T LAST AND YOU DIDN'T HAVE MONEY TO GET MORE.: NEVER TRUE

## 2025-02-03 SDOH — ECONOMIC STABILITY: FOOD INSECURITY: WITHIN THE PAST 12 MONTHS, YOU WORRIED THAT YOUR FOOD WOULD RUN OUT BEFORE YOU GOT MONEY TO BUY MORE.: NEVER TRUE

## 2025-02-03 ASSESSMENT — PATIENT HEALTH QUESTIONNAIRE - PHQ9
SUM OF ALL RESPONSES TO PHQ QUESTIONS 1-9: 0
1. LITTLE INTEREST OR PLEASURE IN DOING THINGS: NOT AT ALL
7. TROUBLE CONCENTRATING ON THINGS, SUCH AS READING THE NEWSPAPER OR WATCHING TELEVISION: NOT AT ALL
4. FEELING TIRED OR HAVING LITTLE ENERGY: NOT AT ALL
8. MOVING OR SPEAKING SO SLOWLY THAT OTHER PEOPLE COULD HAVE NOTICED. OR THE OPPOSITE, BEING SO FIGETY OR RESTLESS THAT YOU HAVE BEEN MOVING AROUND A LOT MORE THAN USUAL: NOT AT ALL
9. THOUGHTS THAT YOU WOULD BE BETTER OFF DEAD, OR OF HURTING YOURSELF: NOT AT ALL
SUM OF ALL RESPONSES TO PHQ QUESTIONS 1-9: 0
SUM OF ALL RESPONSES TO PHQ9 QUESTIONS 1 & 2: 0
6. FEELING BAD ABOUT YOURSELF - OR THAT YOU ARE A FAILURE OR HAVE LET YOURSELF OR YOUR FAMILY DOWN: NOT AT ALL
2. FEELING DOWN, DEPRESSED OR HOPELESS: NOT AT ALL
3. TROUBLE FALLING OR STAYING ASLEEP: NOT AT ALL
10. IF YOU CHECKED OFF ANY PROBLEMS, HOW DIFFICULT HAVE THESE PROBLEMS MADE IT FOR YOU TO DO YOUR WORK, TAKE CARE OF THINGS AT HOME, OR GET ALONG WITH OTHER PEOPLE: NOT DIFFICULT AT ALL
SUM OF ALL RESPONSES TO PHQ QUESTIONS 1-9: 0
SUM OF ALL RESPONSES TO PHQ QUESTIONS 1-9: 0
5. POOR APPETITE OR OVEREATING: NOT AT ALL

## 2025-02-03 NOTE — PROGRESS NOTES
[facility] - Primary Care   PCP progress note: Video Visit     Patient: Kristi Reyes : 1965  Sex: female  MRN: [id number]    Assessment and Plan:     Assessment & Plan  Viral upper respiratory tract infection  Patient reports she tested negative for COVID at home, out of window where we could treat for flu even if she was positive  Discussed symptomatic treatment  Given extent of symptoms, will send for steroid, advised to let me know if not improved in the next week and at that time we will reconsider need for antibiotics, but at this time I have no indication that this is a bacterial infection.                -Risks and benefit as well as most common side effects of new medications were discussed with patient.  -Recommended immunizations and screenings as noted in patients care gaps report. Patient was agreeable to screening tests for appropriate indications as listed below       Patient's questions answered. Additional information printed on AVS.  Patient/Caregiver verbalizes understanding of plan of care/instructions discussed today.        Kristi Reyes, was evaluated through a synchronous (real-time) audio-video encounter. The patient (or guardian if applicable) is aware that this is a billable service, which includes applicable co-pays. This Virtual Visit was conducted with patient's (and/or legal guardian's) consent. Patient identification was verified, and a caregiver was present when appropriate.   The patient was located at Home: [address] [place] [id number]  Provider was located at Home (Appt Dept State): [place]  Confirm you are appropriately licensed, registered, or certified to deliver care in the state where the patient is located as indicated above. If you are not or unsure, please re-schedule the visit: Yes, I confirm.        LOS  Total time spent for this encounter:  10         Follow-up Information  Return if symptoms worsen or fail to improve.      Subjective:  HPI:

## 2025-02-05 ENCOUNTER — TELEPHONE (OUTPATIENT)
Dept: FAMILY MEDICINE CLINIC | Age: 60
End: 2025-02-05

## 2025-02-05 RX ORDER — AZITHROMYCIN 250 MG/1
TABLET, FILM COATED ORAL
Qty: 6 TABLET | Refills: 0 | Status: SHIPPED | OUTPATIENT
Start: 2025-02-05 | End: 2025-02-15

## 2025-02-05 NOTE — TELEPHONE ENCOUNTER
Had recent telehealth visit due to upper respiratory issues. Was advised to call office if not feeling any better to have different med called in. Pt still had cough, sinus drainage, not sleeping due to cough, unsure of fever. Calling to find out if will call med into [facility]. Pt is reachable at [phone]

## 2025-02-05 NOTE — TELEPHONE ENCOUNTER
Pt is aware medication is called in , would like to know if you could extend her work note since she wont be able to go into work today . She plans on going back tomorrow          Note

## 2025-02-05 NOTE — TELEPHONE ENCOUNTER
Pt is aware medication is called in , would like to know if you could extend her work note since she wont be able to go into work today . She plans on going back tomorrow